# Patient Record
Sex: MALE | Race: BLACK OR AFRICAN AMERICAN | Employment: FULL TIME | URBAN - METROPOLITAN AREA
[De-identification: names, ages, dates, MRNs, and addresses within clinical notes are randomized per-mention and may not be internally consistent; named-entity substitution may affect disease eponyms.]

---

## 2017-06-29 ENCOUNTER — OFFICE VISIT (OUTPATIENT)
Dept: FAMILY MEDICINE CLINIC | Age: 32
End: 2017-06-29
Payer: COMMERCIAL

## 2017-06-29 VITALS
OXYGEN SATURATION: 98 % | SYSTOLIC BLOOD PRESSURE: 137 MMHG | WEIGHT: 203 LBS | HEART RATE: 84 BPM | HEIGHT: 69 IN | BODY MASS INDEX: 30.07 KG/M2 | DIASTOLIC BLOOD PRESSURE: 98 MMHG

## 2017-06-29 DIAGNOSIS — Z00.01 ENCOUNTER FOR WELL ADULT EXAM WITH ABNORMAL FINDINGS: ICD-10-CM

## 2017-06-29 PROCEDURE — 99213 OFFICE O/P EST LOW 20 MIN: CPT | Performed by: FAMILY MEDICINE

## 2017-06-29 RX ORDER — FLUTICASONE PROPIONATE 50 MCG
1 SPRAY, SUSPENSION (ML) NASAL DAILY
Qty: 1 BOTTLE | Refills: 3 | Status: SHIPPED | OUTPATIENT
Start: 2017-06-29

## 2017-06-29 ASSESSMENT — PATIENT HEALTH QUESTIONNAIRE - PHQ9
SUM OF ALL RESPONSES TO PHQ9 QUESTIONS 1 & 2: 0
SUM OF ALL RESPONSES TO PHQ QUESTIONS 1-9: 0
2. FEELING DOWN, DEPRESSED OR HOPELESS: 0
1. LITTLE INTEREST OR PLEASURE IN DOING THINGS: 0

## 2017-06-30 RX ORDER — INSULIN GLARGINE 100 [IU]/ML
INJECTION, SOLUTION SUBCUTANEOUS
Qty: 45 ML | Refills: 0 | Status: SHIPPED | OUTPATIENT
Start: 2017-06-30 | End: 2017-10-20 | Stop reason: SDUPTHER

## 2018-01-18 ENCOUNTER — OFFICE VISIT (OUTPATIENT)
Dept: FAMILY MEDICINE CLINIC | Age: 33
End: 2018-01-18
Payer: COMMERCIAL

## 2018-01-18 VITALS
OXYGEN SATURATION: 100 % | HEART RATE: 103 BPM | HEIGHT: 69 IN | WEIGHT: 194 LBS | RESPIRATION RATE: 16 BRPM | BODY MASS INDEX: 28.73 KG/M2 | SYSTOLIC BLOOD PRESSURE: 128 MMHG | DIASTOLIC BLOOD PRESSURE: 83 MMHG

## 2018-01-18 DIAGNOSIS — B36.9 FUNGAL DERMATOSIS: Primary | ICD-10-CM

## 2018-01-18 LAB — HBA1C MFR BLD: 12.4 %

## 2018-01-18 PROCEDURE — 83036 HEMOGLOBIN GLYCOSYLATED A1C: CPT | Performed by: FAMILY MEDICINE

## 2018-01-18 PROCEDURE — 99213 OFFICE O/P EST LOW 20 MIN: CPT | Performed by: FAMILY MEDICINE

## 2018-02-07 ENCOUNTER — INITIAL CONSULT (OUTPATIENT)
Dept: ENDOCRINOLOGY | Age: 33
End: 2018-02-07
Payer: COMMERCIAL

## 2018-02-07 ENCOUNTER — HOSPITAL ENCOUNTER (OUTPATIENT)
Age: 33
Setting detail: SPECIMEN
Discharge: HOME | End: 2018-02-07
Payer: COMMERCIAL

## 2018-02-07 VITALS
SYSTOLIC BLOOD PRESSURE: 122 MMHG | HEART RATE: 87 BPM | HEIGHT: 69 IN | WEIGHT: 201 LBS | DIASTOLIC BLOOD PRESSURE: 80 MMHG | RESPIRATION RATE: 18 BRPM | BODY MASS INDEX: 29.77 KG/M2 | TEMPERATURE: 98 F | OXYGEN SATURATION: 98 %

## 2018-02-07 LAB
ABSOLUTE EOS #: 0.22 K/UL (ref 0–0.44)
ABSOLUTE IMMATURE GRANULOCYTE: <0.03 K/UL (ref 0–0.3)
ABSOLUTE LYMPH #: 1.72 K/UL (ref 1.1–3.7)
ABSOLUTE MONO #: 0.39 K/UL (ref 0.1–1.2)
ALBUMIN SERPL-MCNC: 4.6 G/DL (ref 3.5–5.2)
ALBUMIN/GLOBULIN RATIO: 1.5 (ref 1–2.5)
ALP BLD-CCNC: 108 U/L (ref 40–129)
ALT SERPL-CCNC: 32 U/L (ref 5–41)
ANION GAP SERPL CALCULATED.3IONS-SCNC: 11 MMOL/L (ref 9–17)
AST SERPL-CCNC: 21 U/L
BASOPHILS # BLD: 1 % (ref 0–2)
BASOPHILS ABSOLUTE: 0.03 K/UL (ref 0–0.2)
BILIRUB SERPL-MCNC: 0.24 MG/DL (ref 0.3–1.2)
BILIRUBIN URINE: NEGATIVE
BUN BLDV-MCNC: 12 MG/DL (ref 6–20)
BUN/CREAT BLD: ABNORMAL (ref 9–20)
CALCIUM SERPL-MCNC: 9.4 MG/DL (ref 8.6–10.4)
CHLORIDE BLD-SCNC: 95 MMOL/L (ref 98–107)
CHOLESTEROL/HDL RATIO: 4.3
CHOLESTEROL: 279 MG/DL
CO2: 28 MMOL/L (ref 20–31)
COLOR: YELLOW
COMMENT UA: ABNORMAL
CREAT SERPL-MCNC: 0.64 MG/DL (ref 0.7–1.2)
DIFFERENTIAL TYPE: NORMAL
EOSINOPHILS RELATIVE PERCENT: 4 % (ref 1–4)
GFR AFRICAN AMERICAN: >60 ML/MIN
GFR NON-AFRICAN AMERICAN: >60 ML/MIN
GFR SERPL CREATININE-BSD FRML MDRD: ABNORMAL ML/MIN/{1.73_M2}
GFR SERPL CREATININE-BSD FRML MDRD: ABNORMAL ML/MIN/{1.73_M2}
GLUCOSE BLD-MCNC: 124 MG/DL (ref 70–99)
GLUCOSE BLD-MCNC: 208 MG/DL
GLUCOSE URINE: ABNORMAL
HCT VFR BLD CALC: 46.6 % (ref 40.7–50.3)
HDLC SERPL-MCNC: 65 MG/DL
HEMOGLOBIN: 14.8 G/DL (ref 13–17)
IMMATURE GRANULOCYTES: 0 %
KETONES, URINE: NEGATIVE
LDL CHOLESTEROL: 188 MG/DL (ref 0–130)
LEUKOCYTE ESTERASE, URINE: NEGATIVE
LYMPHOCYTES # BLD: 29 % (ref 24–43)
MCH RBC QN AUTO: 29.8 PG (ref 25.2–33.5)
MCHC RBC AUTO-ENTMCNC: 31.8 G/DL (ref 28.4–34.8)
MCV RBC AUTO: 94 FL (ref 82.6–102.9)
MONOCYTES # BLD: 7 % (ref 3–12)
NITRITE, URINE: NEGATIVE
NRBC AUTOMATED: 0 PER 100 WBC
PDW BLD-RTO: 12.1 % (ref 11.8–14.4)
PH UA: 6.5 (ref 5–8)
PLATELET # BLD: 298 K/UL (ref 138–453)
PLATELET ESTIMATE: NORMAL
PMV BLD AUTO: 10.8 FL (ref 8.1–13.5)
POTASSIUM SERPL-SCNC: 3.7 MMOL/L (ref 3.7–5.3)
PROTEIN UA: NEGATIVE
RBC # BLD: 4.96 M/UL (ref 4.21–5.77)
RBC # BLD: NORMAL 10*6/UL
SEG NEUTROPHILS: 59 % (ref 36–65)
SEGMENTED NEUTROPHILS ABSOLUTE COUNT: 3.47 K/UL (ref 1.5–8.1)
SODIUM BLD-SCNC: 134 MMOL/L (ref 135–144)
SPECIFIC GRAVITY UA: 1.02 (ref 1–1.03)
THYROXINE, FREE: 1.12 NG/DL (ref 0.93–1.7)
TOTAL PROTEIN: 7.6 G/DL (ref 6.4–8.3)
TRIGL SERPL-MCNC: 128 MG/DL
TSH SERPL DL<=0.05 MIU/L-ACNC: 1.47 MIU/L (ref 0.3–5)
TURBIDITY: CLEAR
URINE HGB: NEGATIVE
UROBILINOGEN, URINE: NORMAL
VLDLC SERPL CALC-MCNC: ABNORMAL MG/DL (ref 1–30)
WBC # BLD: 5.9 K/UL (ref 3.5–11.3)
WBC # BLD: NORMAL 10*3/UL

## 2018-02-07 PROCEDURE — 99214 OFFICE O/P EST MOD 30 MIN: CPT | Performed by: INTERNAL MEDICINE

## 2018-02-07 PROCEDURE — 82962 GLUCOSE BLOOD TEST: CPT | Performed by: INTERNAL MEDICINE

## 2018-02-07 RX ORDER — BLOOD-GLUCOSE METER
KIT MISCELLANEOUS
Qty: 1 KIT | Refills: 0 | Status: SHIPPED | OUTPATIENT
Start: 2018-02-07

## 2018-02-07 RX ORDER — SYRING-NEEDL,DISP,INSUL,0.3 ML 30 GX5/16"
1 SYRINGE, EMPTY DISPOSABLE MISCELLANEOUS CONTINUOUS
Qty: 1 EACH | Refills: 0 | Status: SHIPPED | OUTPATIENT
Start: 2018-02-07

## 2018-02-07 RX ORDER — LANCETS 30 GAUGE
EACH MISCELLANEOUS
Qty: 200 EACH | Refills: 5 | Status: SHIPPED | OUTPATIENT
Start: 2018-02-07 | End: 2018-10-23 | Stop reason: SDUPTHER

## 2018-02-08 LAB
CREATININE URINE: 195.7 MG/DL (ref 39–259)
MICROALBUMIN/CREAT 24H UR: 25 MG/L
MICROALBUMIN/CREAT UR-RTO: 13 MCG/MG CREAT

## 2018-02-09 LAB — THYROID PEROXIDASE (TPO) AB: 10.4 IU/ML (ref 0–35)

## 2018-02-22 ENCOUNTER — TELEPHONE (OUTPATIENT)
Dept: FAMILY MEDICINE CLINIC | Age: 33
End: 2018-02-22

## 2018-03-06 ENCOUNTER — OFFICE VISIT (OUTPATIENT)
Dept: ENDOCRINOLOGY | Age: 33
End: 2018-03-06
Payer: COMMERCIAL

## 2018-03-06 VITALS
OXYGEN SATURATION: 98 % | HEIGHT: 69 IN | TEMPERATURE: 98 F | RESPIRATION RATE: 18 BRPM | DIASTOLIC BLOOD PRESSURE: 88 MMHG | BODY MASS INDEX: 30.62 KG/M2 | WEIGHT: 206.7 LBS | SYSTOLIC BLOOD PRESSURE: 126 MMHG | HEART RATE: 83 BPM

## 2018-03-06 LAB
GLUCOSE BLD-MCNC: 165 MG/DL
HBA1C MFR BLD: 8.7 %

## 2018-03-06 PROCEDURE — 82962 GLUCOSE BLOOD TEST: CPT | Performed by: INTERNAL MEDICINE

## 2018-03-06 PROCEDURE — 99214 OFFICE O/P EST MOD 30 MIN: CPT | Performed by: INTERNAL MEDICINE

## 2018-03-06 PROCEDURE — 83036 HEMOGLOBIN GLYCOSYLATED A1C: CPT | Performed by: INTERNAL MEDICINE

## 2018-03-06 NOTE — PROGRESS NOTES
AMERICAN MALE OVER WEIGHT IN NO DISTRESS  VITALS REVIEWED. Eyes: LIZZ.   ENT: THROAT CLEAR. Kayren Quinebaug HEARING NORMAL  Neck: NO MASSES. NO ADENOPATHY. THYROID NOT ENALARGED. NO CAROTID BRUIT  Heart: REGULAR. NO MURMUR   Lungs: BREATHING COMFORTABLY. LUNGS CLEAR. NO WHEEZES  Abdomen: SOFT. NO TENDERNESS. NO MASSES LIVER AND SPLEEN NOT PALPABLE  Extremities: NO EDEMA. NO CALF TENDERNESS. Peripheral vascular : PEDAL PULSES GOOD. Rheumatological : NO JOINT SWELLING  Neurological/Memory: ALERT  AND OREIENTED  X 3 ,MONOFILAMENT SENSATIONS NORMAL. REFLEXES NORMAL. Psychiatric:  MOOD AND AFFECT NORMAL  Skin: NO RASHES  Reviewed Vitals  Eyes:  Neck:   Cardiovascular:    Respiratory:   Abdomen:    Extremities:  Peripheral Vascular:   Neurological:        Assessment:   LAB / IMAGING  POC GLUCOSE TODAY  2/7/18  RANDOM 208     POC A1C ON  1/18/18 RESULT  12.4     LAB 2/7/18  TSH 1.47     , BUN 12, CREAT 0.64, LYTES 134/3.7  ALT 32, ALB 4.6  CBC HGB 14.8, WBC 5.9, PLAT 298,  N 59, L 29  UA 2+ GLU  CHOL 279, HDL 65, ,  TRIG 128  MICRO ALB NOT DONE     IMPRESSION :     UNCONTROLLED DIABETES MELLITUS  OVER WEIGHT  WORKS 4 P.M TO 3 A. M       1. Uncontrolled type 1 diabetes mellitus without complication ([address]          Plan:      1. DISCUSSED  AT LENGTH WITH PATIENT ABOUT  DIABETES, COMPLICATIONS , TREATMENTS TARGETS AND TREATMENT OPTIONS  2. DIET AND EXERCISE  3. TO SEE DIAB EDUCATOR  4. LANTUS 40 UNITS DAILY IN P.M    5. HUMALOG  10  UNITS PLUS  SCALE  TID BEFORE MEALS . SCALE 200   THREE  UNITS AND OVER 250 FIVE UNITS   6. CHECK SUGARS AC HS  7. TO GET LAB ORDERED BY PCP  8. DISCUSSED ABOUT INSULIN PUMP ALSO BECAUSE OF HIS WORK SCHEDULE  9. CHECK C- PEPTIDE. FASTING INSULIN  AND PIPER ANTIBODIE LATER. 10.  RETURN 2  WEEKS. No orders of the defined types were placed in this encounter. No orders of the defined types were placed in this encounter. No Follow-up on file.   Visit date not found        Patient given educational materials - see patient instructions. Discussed use, benefit, and side effects of prescribed medications. All patient questions answered. Pt voiced understanding. Reviewed health maintenance. Instructed to continue current medications, diet and exercise. Patient agreed with treatment plan. Follow up as directed.        Electronically signed by Cristian Gardner MD on 3/6/2018

## 2018-03-07 ENCOUNTER — TELEPHONE (OUTPATIENT)
Dept: ENDOCRINOLOGY | Age: 33
End: 2018-03-07

## 2018-03-07 NOTE — PROGRESS NOTES
COUGH. NO ORTHOPNEA. BOWELS  O.K  NO ACID REFLUX. NO ARTHRITIC PAINS  NO ANXIETY OR DEPRESSION         Past Medical History:   Diagnosis Date    [facility] mellitus [address] [address]     Diabetic ketoacidosis without coma associated with type 1 diabetes mellitus ([facility] [address] 6/7/2016        History reviewed. No pertinent surgical history. History reviewed. No pertinent family history. Social History   Substance Use Topics    Smoking status: Never Smoker    Smokeless tobacco: Never Used    Alcohol use Yes      Comment: social        Current Outpatient Prescriptions   Medication Sig Dispense Refill    insulin glargine (LANTUS SOLOSTAR) 100 UNIT/ML injection pen Inject 46 Units into the skin nightly 5 pen 8    HUMALOG KWIKPEN 100 UNIT/ML pen INJECT AS DIRECTED PER SLIDING SCALE. MAX 12 UNITS THREE TIMES DAILY 15 pen 0    glucose monitoring kit (FREESTYLE) monitoring kit CHECK SUGARS BID  GIVE METER AND STRIPS COVERED BY INS. 1 kit 0    Lancet Device MISC 1 each by Does not apply route continuous May use any brand 1 each 0    Lancets (BD LANCET ULTRAFINE 30G) MISC May use any brand COVERED BY INSURANCE  USE AS DIRECTED DAILY 2 200 each 5    glucose blood VI test strips (ASCENSIA AUTODISC VI;ONE TOUCH ULTRA TEST VI) strip Use with associated glucose meter. CHECK SUGARS 4 TIMES DAILY 200 strip 5    Insulin Pen Needle 32G X 4 MM MISC 4 each by Does not apply route daily 200 each 3    fluticasone (FLONASE) 50 MCG/ACT nasal spray 1 spray by Nasal route daily 1 Bottle 3    Insulin Syringe-Needle U-100 30G X 1/2\" 0.5 ML MISC 1 each by Does not apply route daily 300 each 1     No current facility-administered medications for this visit. No Known Allergies      Subjective:      Review of Systems AS NOTED IN HPI      Objective:      Physical Exam     PATIENT IS  OBESE IN NO DISTRESS  VITALS REVIEWED. WEIGHT UP 5 LB. Eyes: LIZZ.   ENT: THROAT CLEAR. Katherine Cline HEARING NORMAL  Neck: NO MASSES. NO ADENOPATHY. THYROID NOT ENALARGED.

## 2018-03-12 ENCOUNTER — TELEPHONE (OUTPATIENT)
Dept: ENDOCRINOLOGY | Age: 33
End: 2018-03-12

## 2018-10-23 ENCOUNTER — OFFICE VISIT (OUTPATIENT)
Dept: ENDOCRINOLOGY | Age: 33
End: 2018-10-23
Payer: COMMERCIAL

## 2018-10-23 VITALS
WEIGHT: 208.8 LBS | TEMPERATURE: 98.9 F | HEIGHT: 69 IN | SYSTOLIC BLOOD PRESSURE: 108 MMHG | DIASTOLIC BLOOD PRESSURE: 78 MMHG | HEART RATE: 99 BPM | OXYGEN SATURATION: 98 % | BODY MASS INDEX: 30.93 KG/M2

## 2018-10-23 LAB — HBA1C MFR BLD: 9.5 %

## 2018-10-23 PROCEDURE — 99214 OFFICE O/P EST MOD 30 MIN: CPT | Performed by: INTERNAL MEDICINE

## 2018-10-23 PROCEDURE — 83036 HEMOGLOBIN GLYCOSYLATED A1C: CPT | Performed by: INTERNAL MEDICINE

## 2018-10-23 RX ORDER — LANCETS 30 GAUGE
EACH MISCELLANEOUS
Qty: 200 EACH | Refills: 5 | Status: SHIPPED | OUTPATIENT
Start: 2018-10-23 | End: 2018-10-23 | Stop reason: SDUPTHER

## 2018-10-23 RX ORDER — LANCETS 30 GAUGE
EACH MISCELLANEOUS
Qty: 200 EACH | Refills: 5 | Status: SHIPPED | OUTPATIENT
Start: 2018-10-23 | End: 2019-05-15 | Stop reason: SDUPTHER

## 2018-10-23 NOTE — PROGRESS NOTES
Chronic Disease Visit Information    BP Readings from Last 3 Encounters:   03/06/18 126/88   02/07/18 122/80   01/18/18 128/83          Hemoglobin A1C (%)   Date Value   03/06/2018 8.7   01/18/2018 12.4   10/21/2016 9.2     Microalb/Crt. Ratio (mcg/mg creat)   Date Value   02/07/2018 13     LDL Cholesterol (mg/dL)   Date Value   02/07/2018 188 (H)     HDL (mg/dL)   Date Value   02/07/2018 65     BUN (mg/dL)   Date Value   02/07/2018 12     CREATININE (mg/dL)   Date Value   02/07/2018 0.64 (L)     Glucose (mg/dL)   Date Value   03/06/2018 165            Have you changed or started any medications since your last visit including any over-the-counter medicines, vitamins, or herbal medicines? no   Are you having any side effects from any of your medications? -  no  Have you stopped taking any of your medications? Is so, why? -  no    Have you seen any other physician or provider since your last visit? No  Have you had any other diagnostic tests since your last visit? No  Have you been seen in the emergency room and/or had an admission to a hospital since we last saw you? No  Have you had your annual diabetic retinal (eye) exam? No  Have you had your routine dental cleaning in the past 6 months? no    Have you activated your [employer] account? If not, what are your barriers?  No:      Patient Care Team:  Lew Cummings MD as PCP - General         Medical History Review  Past Medical, Family, and Social History reviewed and does contribute to the patient presenting condition    Health Maintenance   Topic Date Due    Diabetic foot exam  03/13/1995    Diabetic retinal exam  03/13/1995    HIV screen  03/13/2000    DTaP/Tdap/Td vaccine (1 - Tdap) 03/13/2004    Pneumococcal med risk (1 of 1 - PPSV23) 03/13/2004    Flu vaccine (1) 09/01/2018    Diabetic microalbuminuria test  02/07/2019    Lipid screen  02/07/2019    A1C test (Diabetic or Prediabetic)  03/06/2019

## 2018-10-23 NOTE — PROGRESS NOTES
 Diabetic ketoacidosis without coma associated with type 1 diabetes mellitus ([facility] [address] 6/7/2016        History reviewed. No pertinent surgical history. History reviewed. No pertinent family history. Social History   Substance Use Topics    Smoking status: Never Smoker    Smokeless tobacco: Never Used    Alcohol use Yes      Comment: social        Current Outpatient Prescriptions   Medication Sig Dispense Refill    insulin lispro (HUMALOG KWIKPEN) 100 UNIT/ML pen 10  UNITS PLUS  SCALE  TID BEFORE MEALS . SCALE 200   THREE  UNITS AND OVER 250 FIVE UNITS 15 pen 2    insulin glargine (BASAGLAR KWIKPEN) 100 UNIT/ML injection pen Inject 46 Units into the skin nightly 15 pen 2    Insulin Pen Needle 32G X 4 MM MISC 4 each by Does not apply route daily 200 each 3    Lancets (BD LANCET ULTRAFINE 30G) MISC May use any brand COVERED BY INSURANCE  USE AS DIRECTED DAILY 2 200 each 5    Ertugliflozin L-PyroglutamicAc (STEGLATRO) 5 MG TABS Take 5 mg by mouth daily 30 tablet 5    glucose monitoring kit (FREESTYLE) monitoring kit CHECK SUGARS BID  GIVE METER AND STRIPS COVERED BY INS. 1 kit 0    Lancet Device MISC 1 each by Does not apply route continuous May use any brand 1 each 0    glucose blood VI test strips (ASCENSIA AUTODISC VI;ONE TOUCH ULTRA TEST VI) strip Use with associated glucose meter. CHECK SUGARS 4 TIMES DAILY 200 strip 5    fluticasone (FLONASE) 50 MCG/ACT nasal spray 1 spray by Nasal route daily 1 Bottle 3     No current facility-administered medications for this visit. No Known Allergies      Subjective:      Review of Systems    AS NOTED IN HPI      Objective:      Physical Exam       PATIENT IS  OBESE IN NO DISTRESS  VITALS REVIEWED. Eyes: LIZZ.   ENT: THROAT CLEAR. Evette Gobble HEARING NORMAL  Neck: NO MASSES. NO ADENOPATHY. THYROID NOT ENALARGED. NO CAROTID BRUIT  Heart: REGULAR. NO MURMUR   Lungs: BREATHING COMFORTABLY. LUNGS CLEAR. NO WHEEZES  Abdomen: SOFT. NO TENDERNESS.  NO MASSES LIVER May use any brand COVERED BY INSURANCE  USE AS DIRECTED DAILY 2     Dispense:  200 each     Refill:  5    Ertugliflozin L-PyroglutamicAc (STEGLATRO) 5 MG TABS     Sig: Take 5 mg by mouth daily     Dispense:  30 tablet     Refill:  5        No Follow-up on file. 5/8/2018        Patient given educational materials - see patient instructions. Discussed use, benefit, and side effects of prescribed medications. All patient questions answered. Pt voiced understanding. Reviewed health maintenance. Instructed to continue current medications, diet and exercise. Patient agreed with treatment plan. Follow up as directed.        Electronically signed by Naye Camacho MD on 10/23/2018

## 2018-10-24 RX ORDER — INSULIN GLARGINE 100 [IU]/ML
46 INJECTION, SOLUTION SUBCUTANEOUS NIGHTLY
Qty: 20 PEN | Refills: 0 | Status: SHIPPED | OUTPATIENT
Start: 2018-10-24 | End: 2019-05-15 | Stop reason: SDUPTHER

## 2019-05-14 NOTE — PROGRESS NOTES
Dr. Analilia Estrada [facility]  [id number] [facility]. Veronica Johnson is a 29 y.o. male who presents today follow up   Chief Complaint   Patient presents with    Diabetes     6 MONTH F/U    Medication Refill     MEDS PENDED   . /60 (Site: Left Upper Arm, Position: Sitting, Cuff Size: Medium Adult)   Pulse 80 Comment: REGULAR  Temp 98 °F (36.7 °C)   Ht 5' 9.29\" (1.76 m)   Wt 212 lb 12.8 oz (96.5 kg)   SpO2 98%   BMI 31.16 kg/m²     Lab Results   Component Value Date    LABA1C 9.5 10/23/2018     Lab Results   Component Value Date     06/07/2016     Wt Readings from Last 3 Encounters:   05/15/19 212 lb 12.8 oz (96.5 kg)   10/23/18 208 lb 12.8 oz (94.7 kg)   03/06/18 206 lb 11.2 oz (93.8 kg)       Body mass index is 31.16 kg/m². BP Readings from Last 3 Encounters:   05/15/19 100/60   10/23/18 108/78   03/06/18 126/88       Diabetes Management   Topic Date Due    Diabetic foot exam  03/13/1995    Diabetic retinal exam  03/13/1995    A1C test (Diabetic or Prediabetic)  01/23/2019    Diabetic microalbuminuria test  02/07/2019    Lipid screen  02/07/2019       HPI:       HPI AND REVIEW OF SYSTEMS :    PATIENT COMES IN FOR FOLLOW UP OF     UNCONTROLLED DIABETES MELLITUS. CURRENT MEDS REVIEWED. PREVIOUS VISIT FROM  10/23/18  REVIEWED. CURRENTLY TAKING LANTUS  55  UNITS HS AND HUMALOG 10  UNITS  PLUS SCALE  TID BEFORE MEALS. TOOK STEGLATRO 5 MG  SAMPLES. WATCHING DIET ,   EXERCISES REGULARLY.      NOT  CHECKED SUGARS FOR 3 WEEKS. PRIOR TO THAT WAS  CHECKING  SUGARS BID .      PATIENT WORKS AT [employer] . HIS WORK HOURS ARE FROM   3 P.M TO 3 A. M     DENIES POLYURIA OR POLY DYPSIA  NO VISION CHANGES. NO RETINOPATHY  NO PARESTHESIAS IN FEET  NO CLAUDICATION  NO DIZZINESS.     NO CHEST PAIN   NO SHORTNESS OF BREATH   NO COUGH. NO ORTHOPNEA. BOWELS  O.K  NO ACID REFLUX.   NO ARTHRITIC PAINS  NO ANXIETY OR DEPRESSION         Past Medical History:   Diagnosis Date    Diabetes mellitus ([facility] [address]     Diabetic ketoacidosis without coma associated with type 1 diabetes mellitus ([facility] [address] 6/7/2016        History reviewed. No pertinent surgical history. History reviewed. No pertinent family history. Social History     Tobacco Use    Smoking status: Never Smoker    Smokeless tobacco: Never Used   Substance Use Topics    Alcohol use: Yes     Comment: social        Current Outpatient Medications   Medication Sig Dispense Refill    insulin lispro (HUMALOG KWIKPEN) 100 UNIT/ML pen INJECT 12 UNITS 3 TIMES A DAY BEFORE MEALS* INJECT AS DIRECTED SLIDING SCALE MAX 12 UNIT 15 pen 1    BASAGLAR KWIKPEN 100 UNIT/ML injection pen INJECT 46 UNITS INTO THE SKIN NIGHTLY 20 pen 0    Insulin Pen Needle 32G X 4 MM MISC 4 each by Does not apply route daily Use as directed 200 each 3    Lancets (BD LANCET ULTRAFINE 30G) MISC May use any brand COVERED BY INSURANCE USE AS DIRECTED DAILY 2 200 each 5    Ertugliflozin L-PyroglutamicAc (STEGLATRO) 5 MG TABS Take 5 mg by mouth daily 90 tablet 0    glucose monitoring kit (FREESTYLE) monitoring kit CHECK SUGARS BID  GIVE METER AND STRIPS COVERED BY INS. 1 kit 0    Lancet Device MISC 1 each by Does not apply route continuous May use any brand 1 each 0    glucose blood VI test strips (ASCENSIA AUTODISC VI;ONE TOUCH ULTRA TEST VI) strip Use with associated glucose meter. CHECK SUGARS 4 TIMES DAILY 200 strip 5    fluticasone (FLONASE) 50 MCG/ACT nasal spray 1 spray by Nasal route daily 1 Bottle 3     No current facility-administered medications for this visit. No Known Allergies      Subjective:      Review of Systems     AS NOTED IN HPI      Objective:      Physical Exam     PATIENT IS  OBESE IN NO DISTRESS  VITALS REVIEWED. Eyes: LIZZ.   ENT: THROAT CLEAR. Tim Bullock HEARING NORMAL  Neck: NO MASSES. NO ADENOPATHY. THYROID NOT ENALARGED. NO CAROTID BRUIT  Heart: REGULAR.  NO MURMUR   Lungs: BREATHING COMFORTABLY. LUNGS CLEAR. NO WHEEZES  Abdomen: SOFT. NO TENDERNESS. NO MASSES LIVER AND SPLEEN NOT PALPABLE  Extremities: NO EDEMA. NO CALF TENDERNESS. Peripheral vascular : PEDAL PULSES GOOD. Rheumatological : NO JOINT SWELLING  Neurological/Memory: ALERT  AND ORIENTED  X 3 ,MONOFILAMENT SENSATIONS NORMAL. REFLEXES NORMAL. Psychiatric:  MOOD AND AFFECT NORMAL  Skin: NO RASHES      Assessment:   LAB     POCT  A1C  5/15/19,  8.8%    POCT  A1C 10/23/18,  9.5%    NOT HAD LAB ORDERED LAST VISIT. C- PEPTIDE AND FASTING GLUCOSE    POCT A1C 3/6/18 TO DAY IN OFFICE IS 8.7    POCT GLUCOSE 3/6/18  RANDOM  165 MG     POC A1C ON  1/18/18   12.4     LAB 2/7/18      TSH 1.47  , BUN 12, CREAT 0.64, LYTES 134/3.7  ALT 32, ALB 4.6  CBC HGB 14.8, WBC 5.9, PLAT 298,  N 59, L 29  UA 2+ GLU  CHOL 279, HDL 65, ,  TRIG 128  MICRO ALB  < 17 MCG/MG     IMPRESSION :     UNCONTROLLED DIABETES MELLITUS POCT  A1C  5/15/19  8.%  OBESITY         1. Uncontrolled type 1 diabetes mellitus without complication ([facility] [address]          Plan:      1. A1C  FROM TODAY DISCUSSED. 2. DIET AND EXERCISE  3. INCREASE LANTUS TO 60 UNITS  DAILY IN P.M    4. CONTINUE HUMALOG  12   UNITS PLUS  SCALE  TID BEFORE MEALS . SCALE 200   THREE  UNITS AND OVER 250 FIVE UNITS   5. CONTINUE STEGLATRO 5 MG DAILY. SIDE  EFFECTS DISCUSSED  . KEEP HYDRATION  6. CHECK SUGARS AC HS  7. NOT HAD FASTING GLUCOSE AND C- PEPTIDE. ORDERED  8.  RETURN  2 MONTHS    Orders Placed This Encounter   Procedures    POCT glycosylated hemoglobin (Hb A1C)     No orders of the defined types were placed in this encounter. No follow-ups on file. 5/8/2018        Patient given educational materials - see patient instructions. Discussed use, benefit, and side effects of prescribed medications. All patient questions answered. Pt voiced understanding. Reviewed health maintenance. Instructed to continue current medications, diet and exercise.   Patient agreed with

## 2019-05-15 ENCOUNTER — HOSPITAL ENCOUNTER (OUTPATIENT)
Age: 34
Setting detail: SPECIMEN
Discharge: HOME | End: 2019-05-15
Payer: COMMERCIAL

## 2019-05-15 ENCOUNTER — OFFICE VISIT (OUTPATIENT)
Dept: ENDOCRINOLOGY | Age: 34
End: 2019-05-15
Payer: COMMERCIAL

## 2019-05-15 VITALS
OXYGEN SATURATION: 98 % | SYSTOLIC BLOOD PRESSURE: 100 MMHG | HEIGHT: 69 IN | BODY MASS INDEX: 31.52 KG/M2 | TEMPERATURE: 98 F | DIASTOLIC BLOOD PRESSURE: 60 MMHG | HEART RATE: 80 BPM | WEIGHT: 212.8 LBS

## 2019-05-15 LAB
C-PEPTIDE: <0.1 NG/ML (ref 1.1–4.4)
GLUCOSE FASTING: 67 MG/DL (ref 70–99)
HBA1C MFR BLD: 8.8 %

## 2019-05-15 PROCEDURE — 99214 OFFICE O/P EST MOD 30 MIN: CPT | Performed by: INTERNAL MEDICINE

## 2019-05-15 PROCEDURE — 83036 HEMOGLOBIN GLYCOSYLATED A1C: CPT | Performed by: INTERNAL MEDICINE

## 2019-05-15 RX ORDER — LANCETS 30 GAUGE
EACH MISCELLANEOUS
Qty: 200 EACH | Refills: 6 | Status: SHIPPED | OUTPATIENT
Start: 2019-05-15

## 2019-05-15 NOTE — PROGRESS NOTES
Chronic Disease Visit Information    BP Readings from Last 3 Encounters:   10/23/18 108/78   03/06/18 126/88   02/07/18 122/80          Hemoglobin A1C (%)   Date Value   10/23/2018 9.5   03/06/2018 8.7   01/18/2018 12.4     Microalb/Crt. Ratio (mcg/mg creat)   Date Value   02/07/2018 13     LDL Cholesterol (mg/dL)   Date Value   02/07/2018 188 (H)     HDL (mg/dL)   Date Value   02/07/2018 65     BUN (mg/dL)   Date Value   02/07/2018 12     CREATININE (mg/dL)   Date Value   02/07/2018 0.64 (L)     Glucose (mg/dL)   Date Value   03/06/2018 165            Have you changed or started any medications since your last visit including any over-the-counter medicines, vitamins, or herbal medicines? no   Are you having any side effects from any of your medications? -  no  Have you stopped taking any of your medications? Is so, why? -  no    Have you seen any other physician or provider since your last visit? No  Have you had any other diagnostic tests since your last visit? No  Have you been seen in the emergency room and/or had an admission to a hospital since we last saw you? No  Have you had your annual diabetic retinal (eye) exam? No  Have you had your routine dental cleaning in the past 6 months? no    Have you activated your [employer] account? If not, what are your barriers?  No:      Patient Care Team:  Ronaldo Irwin MD as PCP - General         Medical History Review  Past Medical, Family, and Social History reviewed and does contribute to the patient presenting condition    Health Maintenance   Topic Date Due    Pneumococcal 0-64 years Vaccine (1 of 1 - PPSV23) 03/13/1991    Diabetic foot exam  03/13/1995    Diabetic retinal exam  03/13/1995    Varicella Vaccine (1 of 2 - 13+ 2-dose series) 03/13/1998    HIV screen  03/13/2000    Hepatitis B Vaccine (1 of 3 - Risk 3-dose series) 03/13/2004    DTaP/Tdap/Td vaccine (1 - Tdap) 03/13/2004    A1C test (Diabetic or Prediabetic)  01/23/2019    Diabetic microalbuminuria test  02/07/2019    Lipid screen  02/07/2019    Flu vaccine (Season Ended) 09/01/2019

## 2019-07-02 RX ORDER — INSULIN GLARGINE 100 [IU]/ML
INJECTION, SOLUTION SUBCUTANEOUS
Qty: 15 PEN | Refills: 1 | Status: SHIPPED | OUTPATIENT
Start: 2019-07-02 | End: 2020-07-13 | Stop reason: SDUPTHER

## 2020-03-31 RX ORDER — INSULIN GLARGINE 100 [IU]/ML
INJECTION, SOLUTION SUBCUTANEOUS
Qty: 5 PEN | Refills: 2 | OUTPATIENT
Start: 2020-03-31

## 2020-04-06 RX ORDER — INSULIN GLARGINE 100 [IU]/ML
INJECTION, SOLUTION SUBCUTANEOUS
Qty: 5 PEN | Refills: 2 | OUTPATIENT
Start: 2020-04-06

## 2020-07-03 RX ORDER — INSULIN LISPRO 100 [IU]/ML
INJECTION, SOLUTION INTRAVENOUS; SUBCUTANEOUS
Refills: 2 | OUTPATIENT
Start: 2020-07-03

## 2020-07-13 ENCOUNTER — OFFICE VISIT (OUTPATIENT)
Dept: ENDOCRINOLOGY | Age: 35
End: 2020-07-13
Payer: COMMERCIAL

## 2020-07-13 VITALS
HEIGHT: 69 IN | TEMPERATURE: 97.7 F | BODY MASS INDEX: 30.81 KG/M2 | DIASTOLIC BLOOD PRESSURE: 86 MMHG | WEIGHT: 208 LBS | SYSTOLIC BLOOD PRESSURE: 120 MMHG | HEART RATE: 92 BPM

## 2020-07-13 PROCEDURE — 99214 OFFICE O/P EST MOD 30 MIN: CPT | Performed by: INTERNAL MEDICINE

## 2020-07-13 PROCEDURE — 83036 HEMOGLOBIN GLYCOSYLATED A1C: CPT | Performed by: INTERNAL MEDICINE

## 2020-07-13 RX ORDER — INSULIN GLARGINE 100 [IU]/ML
INJECTION, SOLUTION SUBCUTANEOUS
Qty: 15 PEN | Refills: 0 | Status: SHIPPED | OUTPATIENT
Start: 2020-07-13 | End: 2020-07-15 | Stop reason: SDUPTHER

## 2020-07-13 RX ORDER — INSULIN LISPRO 100 [IU]/ML
INJECTION, SOLUTION INTRAVENOUS; SUBCUTANEOUS
Qty: 15 PEN | Refills: 0 | Status: SHIPPED | OUTPATIENT
Start: 2020-07-13

## 2020-07-15 ENCOUNTER — TELEPHONE (OUTPATIENT)
Dept: ENDOCRINOLOGY | Age: 35
End: 2020-07-15

## 2020-07-15 RX ORDER — INSULIN GLARGINE 100 [IU]/ML
INJECTION, SOLUTION SUBCUTANEOUS
Qty: 20 PEN | Refills: 0 | Status: SHIPPED | OUTPATIENT
Start: 2020-07-15

## 2020-07-15 NOTE — TELEPHONE ENCOUNTER
Patient basaglar has to be a done 90 day supply or his insurance will not pay for it    He needs a new Rx sent to [facility] for 20 pens.     Please send a new RX

## 2020-08-20 LAB — HBA1C MFR BLD: 10.8 %

## 2021-11-13 ENCOUNTER — HOSPITAL ENCOUNTER (EMERGENCY)
Age: 36
Discharge: HOME | End: 2021-11-13
Attending: EMERGENCY MEDICINE
Payer: COMMERCIAL

## 2021-11-13 ENCOUNTER — APPOINTMENT (OUTPATIENT)
Dept: GENERAL RADIOLOGY | Age: 36
End: 2021-11-13
Payer: COMMERCIAL

## 2021-11-13 VITALS
HEIGHT: 69 IN | TEMPERATURE: 98 F | DIASTOLIC BLOOD PRESSURE: 99 MMHG | WEIGHT: 218 LBS | RESPIRATION RATE: 18 BRPM | OXYGEN SATURATION: 97 % | BODY MASS INDEX: 32.29 KG/M2 | SYSTOLIC BLOOD PRESSURE: 158 MMHG | HEART RATE: 84 BPM

## 2021-11-13 DIAGNOSIS — S20.211A CONTUSION OF RIGHT CHEST WALL, INITIAL ENCOUNTER: Primary | ICD-10-CM

## 2021-11-13 PROCEDURE — 71101 X-RAY EXAM UNILAT RIBS/CHEST: CPT

## 2021-11-13 PROCEDURE — 99282 EMERGENCY DEPT VISIT SF MDM: CPT

## 2021-11-13 PROCEDURE — 99283 EMERGENCY DEPT VISIT LOW MDM: CPT

## 2021-11-13 RX ORDER — NAPROXEN 500 MG/1
500 TABLET ORAL 2 TIMES DAILY
Qty: 20 TABLET | Refills: 0 | Status: SHIPPED | OUTPATIENT
Start: 2021-11-13

## 2021-11-13 ASSESSMENT — PAIN SCALES - GENERAL: PAINLEVEL_OUTOF10: 6

## 2021-11-13 ASSESSMENT — PAIN DESCRIPTION - PAIN TYPE: TYPE: ACUTE PAIN

## 2021-11-13 ASSESSMENT — ENCOUNTER SYMPTOMS
COUGH: 0
VOMITING: 0
SHORTNESS OF BREATH: 0
NAUSEA: 0
ABDOMINAL PAIN: 0
COLOR CHANGE: 0

## 2021-11-13 ASSESSMENT — PAIN DESCRIPTION - LOCATION: LOCATION: RIB CAGE

## 2021-11-13 ASSESSMENT — PAIN DESCRIPTION - ORIENTATION: ORIENTATION: RIGHT

## 2021-11-13 ASSESSMENT — PAIN DESCRIPTION - DESCRIPTORS: DESCRIPTORS: ACHING

## 2021-11-13 NOTE — ED PROVIDER NOTES
Team [address] ED  eMERGENCY dEPARTMENT eNCOUnter      Pt Name: Eboni Tanner  MRN: [id number]  [place] 1985  Date of evaluation: 11/13/2021  Provider: JOSHUA Ohara CNP    CHIEF COMPLAINT       Chief Complaint   Patient presents with    Fall     fell off porch approx 1 week ago, falling onto right side, hitting side on concrete.  Rib Pain (injury)         HISTORY OF PRESENT ILLNESS  (Location/Symptom, Timing/Onset, Context/Setting, Quality, Duration, Modifying Factors, Severity.)   Eboni Tanner is a 39 y.o. male who presents to the emergency department via private auto for pain to his right anterior rib area s/p fall off of his porch 1 week ago. He fell and struck the porch and the ground. Denies hitting his head and LOC. Denies injury to other areas. The pain is worse with movement, inspiration, palpation. Denies pain to his head, neck, back, abdomen. Rates his pain 6/10 at this time. Denies SOB. Nursing Notes were reviewed. ALLERGIES     Patient has no known allergies. CURRENT MEDICATIONS       Discharge Medication List as of 11/13/2021 12:10 PM      CONTINUE these medications which have NOT CHANGED    Details   insulin glargine (BASAGLAR KWIKPEN) 100 UNIT/ML injection pen INJECT 60 UNITS INTO THE SKIN NIGHTLY    Pt wants a 90 day supply, Disp-20 pen,R-0PATIENT OUT OF MEDNormal      insulin lispro, 1 Unit Dial, (HUMALOG KWIKPEN) 100 UNIT/ML SOPN 12 TO  18  UNITS TID BEFORE MEALS, Disp-15 pen,R-0Normal      blood glucose test strips (ASCENSIA AUTODISC VI;ONE TOUCH ULTRA TEST VI) strip Disp-400 strip,R-0, NormalUse with associated glucose meter. CHECK SUGARS 4 TIMES DAILY      glucose monitoring kit (FREESTYLE) monitoring kit Disp-1 kit, R-0, PrintCHECK SUGARS BID GIVE METER AND STRIPS COVERED BY INS.       Insulin Pen Needle 32G X 4 MM MISC Disp-360 each,R-0, NormalUSE FOUR TIMES DAILY WITH INSULIN PENS      Lancets (BD LANCET ULTRAFINE 30G) MISC Disp-200 each, R-6, NormalMay use any brand COVERED BY INSURANCE USE AS DIRECTED DAILY 2      Ertugliflozin L-PyroglutamicAc (STEGLATRO) 5 MG TABS Take 5 mg by mouth daily, Disp-90 tablet, R-1Normal      Lancet Device MISC CONTINUOUS Starting Wed 2/7/2018, Disp-1 each, R-0, PrintMay use any brand      fluticasone (FLONASE) 50 MCG/ACT nasal spray 1 spray by Nasal route daily, Disp-1 Bottle, R-3Normal             PAST MEDICAL HISTORY         Diagnosis Date    Diabetes mellitus ([facility] [address]     Diabetic ketoacidosis without coma associated with type 1 diabetes mellitus ([facility] [address] 6/7/2016       SURGICAL HISTORY     History reviewed. No pertinent surgical history. FAMILY HISTORY     History reviewed. No pertinent family history. Family Status   Relation Name Status    Mother  Alive        SOCIAL HISTORY      reports that he has never smoked. He has never used smokeless tobacco. He reports current alcohol use. He reports that he does not use drugs. REVIEW OF SYSTEMS    (2-9 systems for level 4, 10 or more for level 5)     Review of Systems   Constitutional: Negative for chills, diaphoresis, fatigue and fever. Respiratory: Negative for cough and shortness of breath. Cardiovascular: Negative for chest pain. Gastrointestinal: Negative for abdominal pain, nausea and vomiting. Genitourinary: Negative for dysuria and hematuria. Musculoskeletal: Positive for arthralgias and myalgias. Negative for gait problem, neck pain and neck stiffness. Skin: Negative for color change and rash. Neurological: Negative for dizziness, syncope, facial asymmetry, speech difficulty, weakness, light-headedness, numbness and headaches. Except as noted above the remainder of the review of systems was reviewed and negative.      PHYSICAL EXAM    (up to 7 for level 4, 8 or more for level 5)     ED Triage Vitals [11/13/21 1111]   BP Temp Temp Source Pulse Resp SpO2 Height Weight   (!) 158/99 98 °F (36.7 °C) Oral 84 18 97 % 5' 9\" (1.753 m) 218 lb (98.9 kg) Physical Exam  Vitals reviewed. Constitutional:       General: He is not in acute distress. Appearance: He is well-developed. He is not diaphoretic. Eyes:      General: No scleral icterus. Conjunctiva/sclera: Conjunctivae normal.   Cardiovascular:      Rate and Rhythm: Normal rate. Pulmonary:      Effort: Pulmonary effort is normal. No respiratory distress. Breath sounds: Normal breath sounds. No stridor. No wheezing. Chest:      Chest wall: Tenderness (right anterior lower rib area. ) present. Abdominal:      General: There is no distension. Palpations: Abdomen is soft. Tenderness: There is no abdominal tenderness. There is no guarding. Musculoskeletal:      Cervical back: No swelling, deformity, tenderness or bony tenderness. Thoracic back: No swelling, deformity, tenderness or bony tenderness. Lumbar back: No swelling, deformity, tenderness or bony tenderness. Comments: Moves extremities. Gait steady. Skin:     General: Skin is warm and dry. Findings: No rash. Neurological:      Mental Status: He is alert and oriented to person, place, and time. Psychiatric:         Behavior: Behavior normal.         DIAGNOSTIC RESULTS       RADIOLOGY:   Non-plain film images such as CT, Ultrasound and MRI are read by the radiologist. Plain radiographic images are visualized and preliminarily interpreted by the emergency physician with the below findings:    Interpretation per the Radiologist below, if available at the time of this note:    XR RIBS RIGHT INCLUDE CHEST (MIN 3 VIEWS)    Result Date: 11/13/2021  EXAMINATION: XRAY VIEWS OF THE RIGHT RIBS WITH FRONTAL XRAY VIEW OF THE CHEST 11/13/2021 10:44 am COMPARISON: None.  HISTORY: ORDERING SYSTEM PROVIDED HISTORY: pain, fall TECHNOLOGIST PROVIDED HISTORY: pain, fall Acuity: Acute Type of Exam: Initial FINDINGS: An upright frontal view chest radiograph was obtained, along with multiple oblique images of the chest for a dedicated right rib series study. The heart is enlarged. The mediastinal contour and pleural spaces are otherwise within normal limits. The lungs are clear. There is no focal consolidation or pneumothorax. The pulmonary vascular pattern is unremarkable. No significant thoracic osseous abnormality. There is no acute or healing right-sided rib fracture appreciated. Clear lungs. Cardiomegaly. Unremarkable right rib series. EMERGENCY DEPARTMENT COURSE and DIFFERENTIAL DIAGNOSIS/MDM:   Vitals:    Vitals:    11/13/21 1111   BP: (!) 158/99   Pulse: 84   Resp: 18   Temp: 98 °F (36.7 °C)   TempSrc: Oral   SpO2: 97%   Weight: 218 lb (98.9 kg)   Height: 5' 9\" (1.753 m)       CLINICAL DECISION MAKING:  The patient presented alert with a nontoxic appearance and was seen in conjunction with Dr. Eliazar Hashimoto. Imaging was negative for acute findings. A prescription was written for naprosyn. Follow up with pcp for a recheck. Evaluation and treatment course in the ED, and plan of care upon discharge was discussed in length with the patient. Patient had no further questions prior to being discharged and was instructed to return to the ED for new or worsening symptoms. Care was provided during an unprecedented national emergency due to the novel coronavirus, Covid-19. FINAL IMPRESSION      1.  Contusion of right chest wall, initial encounter            Problem List  Patient Active Problem List   Diagnosis Code    Diabetes mellitus ([address] E11.9    Diabetic ketoacidosis without coma associated with type 1 diabetes mellitus ([address] E10.10    Hyperkalemia E87.5    Hyponatremia E87.1         DISPOSITION/PLAN   DISPOSITION Decision To Discharge 11/13/2021 12:00:04 PM      PATIENT REFERRED TO:   Eh Quintana MD  [address] [place] [address], [address]  [phone]    Schedule an appointment as soon as possible for a visit       [facility] ED  [address] [facility] [address] [address]  [phone]    If symptoms worsen, As needed      DISCHARGE MEDICATIONS:     Discharge Medication List as of 11/13/2021 12:10 PM      START taking these medications    Details   naproxen (NAPROSYN) 500 MG tablet Take 1 tablet by mouth 2 times daily, Disp-20 tablet, R-0Normal                 (Please note that portions of this note were completed with a voice recognition program.  Efforts were made to edit the dictations but occasionally words are mis-transcribed.)    JOSHUA Rodriguez CNP, APRN - CNP  11/13/21 [id number]

## 2021-11-13 NOTE — ED PROVIDER NOTES
eMERGENCY dEPARTMENT eNCOUnter   Independent Attestation     Pt Name: Irma Dee  MRN: [id number]  [place] 1985  Date of evaluation: 11/13/21     Irma Dee is a 39 y.o. male with CC: Fall (fell off porch approx 1 week ago, falling onto right side, hitting side on concrete. ) and Rib Pain (injury)      Based on the medical record the care appears appropriate. I was personally available for consultation in the Emergency Department. The care is provided during an unprecedented national emergency due to the novel coronavirus, COVID 19.     Virginia Benavides MD  Attending Emergency Physician                 Cheyanne Meza MD  11/13/21 [id number]

## 2024-06-10 NOTE — PROGRESS NOTES
Addended by: ROE SHEPARD on: 6/10/2024 01:26 PM     Modules accepted: Orders    
Chronic Disease Visit Information    BP Readings from Last 3 Encounters:   01/18/18 128/83   06/29/17 (!) 137/98   10/21/16 124/85          Hemoglobin A1C (%)   Date Value   10/21/2016 9.2   06/07/2016 10.2 (H)     LDL Cholesterol (mg/dL)   Date Value   10/21/2016 135 (H)     HDL (mg/dL)   Date Value   10/21/2016 62     BUN (mg/dL)   Date Value   10/21/2016 16     CREATININE (mg/dL)   Date Value   10/21/2016 0.72     Glucose (mg/dL)   Date Value   10/21/2016 153 (H)            Have you changed or started any medications since your last visit including any over-the-counter medicines, vitamins, or herbal medicines? no   Are you having any side effects from any of your medications? -  no  Have you stopped taking any of your medications? Is so, why? -  no    Have you seen any other physician or provider since your last visit? No  Have you had any other diagnostic tests since your last visit? No  Have you been seen in the emergency room and/or had an admission to a hospital since we last saw you? No  Have you had your annual diabetic retinal (eye) exam? No  Have you had your routine dental cleaning in the past 6 months? yes -     Have you activated your [employer] account? If not, what are your barriers?  No:      Patient Care Team:  Gina Castillo MD as PCP - General         Medical History Review  Past Medical, Family, and Social History reviewed and does contribute to the patient presenting condition    Health Maintenance   Topic Date Due    Diabetic foot exam  03/13/1995    Diabetic retinal exam  03/13/1995    HIV screen  03/13/2000    Diabetic microalbuminuria test  03/13/2003    DTaP/Tdap/Td vaccine (1 - Tdap) 03/13/2004    Pneumococcal med risk (1 of 1 - PPSV23) 03/13/2004    Flu vaccine (1) 09/01/2017    A1C test (Diabetic or Prediabetic)  10/21/2017    Lipid screen  10/21/2017
188,  TRIG 128  MICRO ALB NOT DONE    IMPRESSION :    UNCONTROLLED DIABETES MELLITUS  OVER WEIGHT  WORKS 4 P.M TO 3 A.M        1. Uncontrolled type 1 diabetes mellitus without complication ([address]              Plan:      1. DISCUSSED  AT LENGTH WITH PATIENT ABOUT  DIABETES, COMPLICATIONS , TREATMENTS TARGETS AND TREATMENT OPTIONS  2. DIET AND EXERCISE  3. TO SEE DIAB EDUCATOR  4. LANTUS 40 UNITS DAILY IN P.M    5. HUMALOG  10  UNITS PLUS  SCALE  TID BEFORE MEALS . SCALE 200   THREE  UNITS AND OVER 250 FIVE UNITS   6. CHECK SUGARS AC HS  7. TO GET LAB ORDERED BY PCP  8. DISCUSSED ABOUT INSULIN PUMP ALSO BECAUSE OF HIS WORK SCHEDULE  9. CHECK C- PEPTIDE. FASTING INSULIN  AND PIPER ANTIBODIE LATER. 10.  RETURN 2  WEEKS. Orders Placed This Encounter   Procedures    Amb Referral to Nutrition Services     Referral Priority:   Routine     Referral Type:   Consult for Advice and Opinion     Requested Specialty:   Nutrition     Number of Visits Requested:   [address] [facility]     Referral Priority:   Routine     Referral Type:   Consult for Advice and Opinion     Referral Reason:   Specialty Services Required     Number of Visits Requested:   1    POCT Glucose     Orders Placed This Encounter   Medications    insulin glargine (LANTUS SOLOSTAR) 100 UNIT/ML injection pen     Sig: Inject 40 Units into the skin nightly     Dispense:  18 mL     Refill:  3    insulin lispro (HUMALOG KWIKPEN) 100 UNIT/ML pen     Si UNITS  TID BEFORE MEALSINJECT AS DIRECTED SLIDING SCALE MAX 12 UNIT     Dispense:  5 pen     Refill:  5     Pt needs appt.  glucose monitoring kit (FREESTYLE) monitoring kit     Sig: CHECK SUGARS BID  GIVE METER AND STRIPS COVERED BY INS.      Dispense:  1 kit     Refill:  0    Lancet Device MISC     Si each by Does not apply route continuous May use any brand     Dispense:  1 each     Refill:  0    Lancets (BD LANCET ULTRAFINE 30G) MISC     Sig: May use any brand COVERED